# Patient Record
(demographics unavailable — no encounter records)

---

## 2024-10-09 NOTE — ASSESSMENT
[FreeTextEntry1] : Results of sleep study reviewed with patient. Treatment options including weight loss oral appliance therapy and PAP therapy were reviewed with patient. After careful review of sleep study patient desire and risk factors recommend initial therapy with oral appliance therapy. Patient referred to sleep dentist. Should return to office after completion of fitting for clinical reassessment and to decide whether further sleep testing will be required.

## 2024-10-09 NOTE — REASON FOR VISIT
[Home] : at home, [unfilled] , at the time of the visit. [Medical Office: ([facility])___] : at the medical office located in  [Patient] : the patient

## 2024-10-09 NOTE — HISTORY OF PRESENT ILLNESS
[TextBox_4] : Telehealth visit to review results of home sleep study no change in history as presented

## 2024-10-09 NOTE — PROCEDURE
[FreeTextEntry1] : Home sleep study demonstrates moderate positional sleep apnea Nonsupine AHI is normal

## 2024-10-30 NOTE — PLAN
[FreeTextEntry1] : continue medications Levothyroxine Liothyronine Rosuvastatin chronic medical conditions HLD Hypothyroidism  Start Prednisone and Augmentin  Start Flonase for PND  Start Benzonatate for cough  May need CXR

## 2024-10-30 NOTE — PHYSICAL EXAM
[No Acute Distress] : no acute distress [Well Nourished] : well nourished [Well Developed] : well developed [Well-Appearing] : well-appearing [Normal Voice/Communication] : normal voice/communication [Normal Sclera/Conjunctiva] : normal sclera/conjunctiva [PERRL] : pupils equal round and reactive to light [EOMI] : extraocular movements intact [Normal Outer Ear/Nose] : the outer ears and nose were normal in appearance [Normal Oropharynx] : the oropharynx was normal [Normal TMs] : both tympanic membranes were normal [No JVD] : no jugular venous distention [No Lymphadenopathy] : no lymphadenopathy [Supple] : supple [Thyroid Normal, No Nodules] : the thyroid was normal and there were no nodules present [No Respiratory Distress] : no respiratory distress  [No Accessory Muscle Use] : no accessory muscle use [Diffuse Wheezing] : diffuse wheezing was heard [Decreased Breath Sounds] : breath sounds were decreased diffusely [Wheezing Bilaterally] : wheezing was heard diffusely over both lungs [Decreased Breath Sounds Bilaterally] : breath sounds were diminished over both lungs [Normal Rate] : normal rate  [Regular Rhythm] : with a regular rhythm [Normal S1, S2] : normal S1 and S2 [No Murmur] : no murmur heard [No Carotid Bruits] : no carotid bruits [No Abdominal Bruit] : a ~M bruit was not heard ~T in the abdomen [No Varicosities] : no varicosities [Pedal Pulses Present] : the pedal pulses are present [No Edema] : there was no peripheral edema [No Palpable Aorta] : no palpable aorta [No Extremity Clubbing/Cyanosis] : no extremity clubbing/cyanosis [Soft] : abdomen soft [Non Tender] : non-tender [Non-distended] : non-distended [No Masses] : no abdominal mass palpated [No HSM] : no HSM [Normal Bowel Sounds] : normal bowel sounds [Normal Supraclavicular Nodes] : no supraclavicular lymphadenopathy [Normal Posterior Cervical Nodes] : no posterior cervical lymphadenopathy [Normal Anterior Cervical Nodes] : no anterior cervical lymphadenopathy [No CVA Tenderness] : no CVA  tenderness [No Spinal Tenderness] : no spinal tenderness [No Joint Swelling] : no joint swelling [Grossly Normal Strength/Tone] : grossly normal strength/tone [No Rash] : no rash [Coordination Grossly Intact] : coordination grossly intact [No Focal Deficits] : no focal deficits [Normal Gait] : normal gait [Deep Tendon Reflexes (DTR)] : deep tendon reflexes were 2+ and symmetric [Speech Grossly Normal] : speech grossly normal [Memory Grossly Normal] : memory grossly normal [Normal Affect] : the affect was normal [Alert and Oriented x3] : oriented to person, place, and time [Normal Mood] : the mood was normal [Normal Insight/Judgement] : insight and judgment were intact

## 2024-10-30 NOTE — HEALTH RISK ASSESSMENT
[Yes] : Yes [2 - 4 times a month (2 pts)] : 2-4 times a month (2 points) [1 or 2 (0 pts)] : 1 or 2 (0 points) [Never (0 pts)] : Never (0 points) [No] : In the past 12 months have you used drugs other than those required for medical reasons? No [No falls in past year] : Patient reported no falls in the past year [Little interest or pleasure doing things] : 1) Little interest or pleasure doing things [Feeling down, depressed, or hopeless] : 2) Feeling down, depressed, or hopeless [0] : 2) Feeling down, depressed, or hopeless: Not at all (0) [PHQ-2 Negative - No further assessment needed] : PHQ-2 Negative - No further assessment needed [Never] : Never [[id number]] : 0

## 2024-10-30 NOTE — END OF VISIT
[FreeTextEntry3] : "I, Alfredito Kirkpatrick, personally scribed the services dictated to me by Dr. Asael Andrade MD in this documentation on 10/30/2024"   "I Dr. Asael Andrade MD, personally performed the services described in this documentation on 10/30/2024 for the patient as scribed by Alfredito Kirkpatrick in my presence. I have reviewed and verified that all the information is accurate and true."

## 2024-10-30 NOTE — HISTORY OF PRESENT ILLNESS
[Moderate] : moderate [___ Weeks ago] :  [unfilled] weeks ago [Episodic] : episodic  [Congestion] : congestion [Cough] : cough [Wheezing] : wheezing [Stable] : stable [Sore Throat] : no sore throat [Chills] : no chills [Anorexia] : no anorexia [Shortness Of Breath] : no shortness of breath [Earache] : no earache [Fatigue] : not fatigue [Headache] : no headache [Fever] : no fever [FreeTextEntry8] : REMIGIO MORA is a 55 year old F who presents today for an acute visit complaining of a cough, congestion and wheezing that began two weeks ago. Pt has a hx of HLD and hypothyroidism. Pt states having a postnasal drip. Pt initially had a low-grade fever which has gradually improved since her symptoms began. Pt states experiencing some chest discomfort from coughing. Pt has taken Dayquil for her symptoms with minor relief. Pt's  is sick at home with similar symptoms.